# Patient Record
Sex: FEMALE | Race: BLACK OR AFRICAN AMERICAN | Employment: UNEMPLOYED | URBAN - METROPOLITAN AREA
[De-identification: names, ages, dates, MRNs, and addresses within clinical notes are randomized per-mention and may not be internally consistent; named-entity substitution may affect disease eponyms.]

---

## 2020-03-06 ENCOUNTER — OFFICE VISIT (OUTPATIENT)
Dept: ENT CLINIC | Age: 30
End: 2020-03-06
Payer: MEDICAID

## 2020-03-06 ENCOUNTER — HOSPITAL ENCOUNTER (EMERGENCY)
Age: 30
Discharge: HOME | End: 2020-03-06
Attending: EMERGENCY MEDICINE
Payer: MEDICAID

## 2020-03-06 VITALS
HEIGHT: 68 IN | OXYGEN SATURATION: 97 % | RESPIRATION RATE: 20 BRPM | BODY MASS INDEX: 22.73 KG/M2 | WEIGHT: 150 LBS | HEART RATE: 70 BPM | TEMPERATURE: 98.2 F | DIASTOLIC BLOOD PRESSURE: 67 MMHG | SYSTOLIC BLOOD PRESSURE: 119 MMHG

## 2020-03-06 VITALS — HEART RATE: 74 BPM | DIASTOLIC BLOOD PRESSURE: 74 MMHG | OXYGEN SATURATION: 97 % | SYSTOLIC BLOOD PRESSURE: 118 MMHG

## 2020-03-06 LAB — S PYO AG THROAT QL: NEGATIVE

## 2020-03-06 PROCEDURE — 99203 OFFICE O/P NEW LOW 30 MIN: CPT | Performed by: OTOLARYNGOLOGY

## 2020-03-06 PROCEDURE — 6370000000 HC RX 637 (ALT 250 FOR IP): Performed by: PHYSICIAN ASSISTANT

## 2020-03-06 PROCEDURE — 6360000002 HC RX W HCPCS: Performed by: PHYSICIAN ASSISTANT

## 2020-03-06 PROCEDURE — 99283 EMERGENCY DEPT VISIT LOW MDM: CPT

## 2020-03-06 PROCEDURE — 87880 STREP A ASSAY W/OPTIC: CPT

## 2020-03-06 PROCEDURE — 87081 CULTURE SCREEN ONLY: CPT

## 2020-03-06 RX ORDER — HYDROCODONE BITARTRATE AND ACETAMINOPHEN 5; 325 MG/1; MG/1
1 TABLET ORAL EVERY 6 HOURS PRN
Qty: 20 TABLET | Refills: 0 | Status: SHIPPED | OUTPATIENT
Start: 2020-03-06 | End: 2020-03-13

## 2020-03-06 RX ORDER — IBUPROFEN 600 MG/1
600 TABLET ORAL ONCE
Status: COMPLETED | OUTPATIENT
Start: 2020-03-06 | End: 2020-03-06

## 2020-03-06 RX ORDER — CLINDAMYCIN HYDROCHLORIDE 300 MG/1
300 CAPSULE ORAL 3 TIMES DAILY
Qty: 30 CAPSULE | Refills: 0 | Status: SHIPPED | OUTPATIENT
Start: 2020-03-06

## 2020-03-06 RX ORDER — AZITHROMYCIN 250 MG/1
TABLET, FILM COATED ORAL
Qty: 1 PACKET | Refills: 0 | Status: SHIPPED | OUTPATIENT
Start: 2020-03-06 | End: 2020-03-16

## 2020-03-06 RX ORDER — DEXAMETHASONE SODIUM PHOSPHATE 10 MG/ML
10 INJECTION, SOLUTION INTRAMUSCULAR; INTRAVENOUS ONCE
Status: COMPLETED | OUTPATIENT
Start: 2020-03-06 | End: 2020-03-06

## 2020-03-06 RX ORDER — METHYLPREDNISOLONE 4 MG/1
4 TABLET ORAL SEE ADMIN INSTRUCTIONS
Qty: 1 KIT | Refills: 0 | Status: SHIPPED | OUTPATIENT
Start: 2020-03-06

## 2020-03-06 RX ORDER — TOPIRAMATE 100 MG/1
150 TABLET, FILM COATED ORAL 2 TIMES DAILY
COMMUNITY

## 2020-03-06 RX ADMIN — DEXAMETHASONE SODIUM PHOSPHATE 10 MG: 10 INJECTION, SOLUTION INTRAMUSCULAR; INTRAVENOUS at 11:35

## 2020-03-06 RX ADMIN — IBUPROFEN 600 MG: 600 TABLET, FILM COATED ORAL at 11:35

## 2020-03-06 ASSESSMENT — ENCOUNTER SYMPTOMS
SORE THROAT: 1
RESPIRATORY NEGATIVE: 1
SINUS PRESSURE: 0
COUGH: 0
TROUBLE SWALLOWING: 1
EYES NEGATIVE: 1
SORE THROAT: 1
ABDOMINAL PAIN: 0
ALLERGIC/IMMUNOLOGIC NEGATIVE: 1
SINUS PAIN: 0
VOMITING: 0
FACIAL SWELLING: 0
VOICE CHANGE: 0
TROUBLE SWALLOWING: 0
NAUSEA: 0
RHINORRHEA: 0
SHORTNESS OF BREATH: 0
RHINORRHEA: 0
VOICE CHANGE: 0

## 2020-03-06 ASSESSMENT — PAIN SCALES - GENERAL: PAINLEVEL_OUTOF10: 10

## 2020-03-06 ASSESSMENT — PAIN DESCRIPTION - PAIN TYPE: TYPE: ACUTE PAIN

## 2020-03-06 ASSESSMENT — PAIN DESCRIPTION - LOCATION: LOCATION: THROAT

## 2020-03-06 NOTE — ED PROVIDER NOTES
I independently performed a history and physical on Anthony Gould. All diagnostic, treatment, and disposition decisions were made by myself in conjunction with the advanced practice provider. Briefly, this is a 31 y.o. female here for sore throat and a possible abscess. The patient was seen in urgent care today. They were concerned she had a peritonsillar abscess, so they called EMS and sent her here. The sore throat is been the last 3 days. She is not having shortness of breath. On exam, the patient is significant tonsillar edema bilaterally with patella petechiae and exudates. There is mild deviation of the uvula, but does not significantly deviate. There is not appear to be severe swelling of the peritonsillar pillars. Patient has no stridor. She is handling secretions well. Breathing is unlabored. Speech is clear. MDM  Based on the exam, I can understand why urgent care thought the patient may have a peritonsillar abscess. However, I believe this is simply due to significant swelling of the tonsils. Patient is particularly concerned about it and is supposed to fly out of town later tonight, so I decided to send her to ENT clinic for confirmation. I spoke to Dr. Yamil Steinberg who agreed. We will discharge the patient there. FINAL IMPRESSION  1. Acute pharyngitis, unspecified etiology        Blood pressure 119/67, pulse 70, temperature 98.2 °F (36.8 °C), temperature source Oral, resp. rate 20, height 5' 8\" (1.727 m), weight 150 lb (68 kg), last menstrual period 02/19/2020, SpO2 97 %.      For further details of [facility] emergency department encounter, please see documentation by advanced practice provider, ALIREZA Sawyer.        Halle Moffett MD  03/06/20 0079

## 2020-03-06 NOTE — PROGRESS NOTES
SUBJECTIVE:    Chief Complaint   Patient presents with    Other     Patient was seen in ER, need to be evaluated for abscess        Johnette Hodgkin is a 31 y.o. female    Patient is here from out of town and wishes to fly back to her home in [place]. Over the last 3 to 4 days she has noted a sore throat that is been somewhat increasing in severity making it somewhat difficult to swallow but she is swallowing liquids quite readily keeping herself hydrated. Over-the-counter medications have all that been used in the event fully successful. She denies any fever. Previously she had had a problem with sinus problems when she comes to this area but this is a quite different problem. She is not been hoarse. She has had no stridor. She does not smoke cigarettes. Past Medical History:   Diagnosis Date    Pneumonia     5/2017    Seizures ([facility] [address] 05/2017    seizure then dignosed with pneumonia    Thrombocytopenia ([facility] [address]       Past Surgical History:   Procedure Laterality Date    MANDIBLE FRACTURE SURGERY        No family history on file. Social History     Tobacco Use    Smoking status: Current Every Day Smoker     Types: Cigars    Smokeless tobacco: Never Used    Tobacco comment: 5 CIGARS/DAY   Substance Use Topics    Alcohol use: Yes     Comment: RARE        Review of Systems:  Review of Systems   Constitutional: Negative. Negative for chills and fever. HENT: Positive for sore throat and trouble swallowing. Negative for congestion, dental problem, drooling, ear discharge, ear pain, facial swelling, hearing loss, mouth sores, nosebleeds, postnasal drip, rhinorrhea, sinus pressure, sinus pain, sneezing, tinnitus and voice change. Eyes: Negative. Respiratory: Negative. Cardiovascular: Negative. Endocrine: Negative. Skin: Negative. Allergic/Immunologic: Negative. Neurological: Negative. Psychiatric/Behavioral: Negative.         OBJECTIVE:  /74   Pulse 74   LMP 02/19/2020

## 2020-03-06 NOTE — ED PROVIDER NOTES
[address] [facility]        Pt Name: Sonja Marcelo  MRN: [id number]  [place] 1990  Date of evaluation: 3/6/2020  Provider: Gene Payne PA-C  PCP: No primary care provider on file. I have seen and evaluated this patient with my supervising physician Aminta Stevens MD.    [address] [place] [address]       Chief Complaint   Patient presents with    Pharyngitis     Pt. comes in by [address] [place] [address] EMS from urgent care to rule out a possible peritonsilar abscess. Pt. reports that she has been having a sore throat for the last three days. Pt. reports having difficulty with swallowing. HISTORY OF PRESENT ILLNESS   (Location, Timing/Onset, Context/Setting, Quality, Duration, Modifying Factors, Severity, Associated Signs and Symptoms)  Note limiting factors. Sonja Marcelo is a 31 y.o. female who presents to the emergency department today for evaluation for a sore throat. The patient has been complaining of a sore throat and this is been ongoing for the past 3 days. The patient states that she had worsening pain today, and she states that she was actually seen at urgent care. The patient states that they did not test her for strep, she states that \"they told me that I could have an abscess and just only to come to the emergency room. The patient is complaining of pain to her throat, particularly when she swallows she is rating her pain as a 10/10, she denies any drooling or difficulty swallowing. She has no fever chills. No cough or congestion. She has no chest pain shortness of breath. No abdominal pain. No nausea or vomiting. The patient denies any drooling. She otherwise has no complaints at this time    Nursing Notes were all reviewed and agreed with or any disagreements were addressed in the HPI.     REVIEW OF SYSTEMS    (2-9 systems for level 4, 10 or more for level 5)     Review of Systems   Constitutional: Negative for activity orders to display     No results found. PROCEDURES   Unless otherwise noted below, none     Procedures    CRITICAL CARE TIME   N/A    CONSULTS:  IP CONSULT TO OTOLARYNGOLOGY      EMERGENCY DEPARTMENT COURSE and DIFFERENTIAL DIAGNOSIS/MDM:   Vitals:    Vitals:    03/06/20 1022 03/06/20 1316   BP: (!) 130/54 119/67   Pulse: 73 70   Resp: 16 20   Temp: 97.7 °F (36.5 °C) 98.2 °F (36.8 °C)   TempSrc: Oral Oral   SpO2: 100% 97%   Weight: 150 lb (68 kg)    Height: 5' 8\" (1.727 m)        Patient was given the following medications:  Medications   dexamethasone (PF) (DECADRON) injection 10 mg (10 mg Oral Given 3/6/20 1135)   ibuprofen (ADVIL;MOTRIN) tablet 600 mg (600 mg Oral Given 3/6/20 1135)       The patient  presents to the emergency department today for evaluation for a sore throat. The patient has been complaining of a sore throat and this is been ongoing for the past 3 days. The patient states that she had worsening pain today, and she states that she was actually seen at urgent care. The patient states that they did not test her for strep, she states that \"they told me that I could have an abscess and just only to come to the emergency room. The patient is complaining of pain to her throat, particularly when she swallows she is rating her pain as a 10/10, she denies any drooling or difficulty swallowing. She has no fever chills. No cough or congestion. She has no chest pain shortness of breath. No abdominal pain. No nausea or vomiting. The patient denies any drooling. She otherwise has no complaints at this time    On physical exam, posterior oropharynx is erythematous, and tonsils are symmetrical with exudate. No evidence of peritonsillar abscess at this time      Rapid strep is negative, however based on the appearance of the throat, will empirically treat with a Z-Sukhjinder for strep pharyngitis. Mono testing is pending.     ENT was consulted, they will actually see the patient today, patient will be discharged from the ED and sent to the ENT office the patient is supposed to leave today, and is very concerned about her throat, and this is why ENT was consulted. Patient is to obtain follow-up as discussed. She is to return to the ED for any new or worsening symptoms. Patient voiced understanding is agreeable with plan. Stable for discharge. My suspicion is low at this time for retropharyngeal abscess, peritonsillar abscess, epiglottitis, meningitis or other emergent etiology      FINAL IMPRESSION      1. Acute pharyngitis, unspecified etiology          DISPOSITION/PLAN   DISPOSITION Decision To Discharge 03/06/2020 01:13:19 PM      PATIENT REFERREDTO:  Andre Sharma MD  [place] [facility] [address] Emergency Department  [address] [place] [address]  [phone]    If symptoms worsen, As needed      DISCHARGE MEDICATIONS:  New Prescriptions    AZITHROMYCIN (ZITHROMAX) 250 MG TABLET    Take 2 tablets (500 mg) on Day 1, followed by 1 tablet (250 mg) once daily on Days 2 through 5.        DISCONTINUED MEDICATIONS:  Discontinued Medications    No medications on file              (Please note that portions of this note were completed with a voice recognition program.  Efforts were made to edit the dictations but occasionally words are mis-transcribed.)    Nasima Sharma PA-C (electronically signed)            Nasima Sharma PA-C  03/06/20 4002

## 2020-03-06 NOTE — ED NOTES
Bed: 07  Expected date:   Expected time:   Means of arrival:   Comments:  Carol Ann Bryan, RN  03/06/20 1663

## 2020-03-08 LAB
ORGANISM: ABNORMAL
S PYO THROAT QL CULT: ABNORMAL
S PYO THROAT QL CULT: ABNORMAL

## 2020-11-03 PROBLEM — J18.9 PNEUMONIA DUE TO ORGANISM: Status: RESOLVED | Noted: 2020-11-03 | Resolved: 2020-11-03

## 2022-07-05 ENCOUNTER — HOSPITAL ENCOUNTER (OUTPATIENT)
Dept: GENERAL RADIOLOGY | Age: 32
Discharge: HOME | End: 2022-07-05
Payer: COMMERCIAL

## 2022-07-05 ENCOUNTER — HOSPITAL ENCOUNTER (OUTPATIENT)
Age: 32
Discharge: HOME | End: 2022-07-05
Payer: COMMERCIAL
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
## 2022-07-05 PROCEDURE — 73560 X-RAY EXAM OF KNEE 1 OR 2: CPT

## 2022-07-05 PROCEDURE — 72100 X-RAY EXAM L-S SPINE 2/3 VWS: CPT

## 2022-07-05 PROCEDURE — 73610 X-RAY EXAM OF ANKLE: CPT
